# Patient Record
Sex: FEMALE | Race: WHITE | NOT HISPANIC OR LATINO | Employment: FULL TIME | URBAN - METROPOLITAN AREA
[De-identification: names, ages, dates, MRNs, and addresses within clinical notes are randomized per-mention and may not be internally consistent; named-entity substitution may affect disease eponyms.]

---

## 2017-06-29 ENCOUNTER — TELEPHONE (OUTPATIENT)
Dept: OBSTETRICS AND GYNECOLOGY | Facility: CLINIC | Age: 47
End: 2017-06-29

## 2017-07-02 RX ORDER — DESOGESTREL AND ETHINYL ESTRADIOL 0.15-0.03
1 KIT ORAL DAILY
Qty: 84 TABLET | Refills: 0 | Status: SHIPPED | OUTPATIENT
Start: 2017-07-02 | End: 2018-01-19 | Stop reason: SDUPTHER

## 2017-10-31 RX ORDER — DESOGESTREL AND ETHINYL ESTRADIOL 0.15-0.03
KIT ORAL
Qty: 84 TABLET | Refills: 0 | OUTPATIENT
Start: 2017-10-31

## 2017-11-08 ENCOUNTER — OFFICE VISIT (OUTPATIENT)
Dept: RETAIL CLINIC | Facility: CLINIC | Age: 47
End: 2017-11-08

## 2017-11-08 VITALS
RESPIRATION RATE: 18 BRPM | OXYGEN SATURATION: 98 % | HEART RATE: 109 BPM | SYSTOLIC BLOOD PRESSURE: 118 MMHG | DIASTOLIC BLOOD PRESSURE: 80 MMHG | TEMPERATURE: 99.3 F

## 2017-11-08 DIAGNOSIS — H66.001 ACUTE SUPPURATIVE OTITIS MEDIA OF RIGHT EAR WITHOUT SPONTANEOUS RUPTURE OF TYMPANIC MEMBRANE, RECURRENCE NOT SPECIFIED: Primary | ICD-10-CM

## 2017-11-08 PROBLEM — H92.02 LEFT EAR PAIN: Status: RESOLVED | Noted: 2017-11-08 | Resolved: 2017-11-08

## 2017-11-08 PROBLEM — H92.01 RIGHT EAR PAIN: Status: ACTIVE | Noted: 2017-11-08

## 2017-11-08 PROBLEM — H92.02 LEFT EAR PAIN: Status: ACTIVE | Noted: 2017-11-08

## 2017-11-08 PROCEDURE — 99213 OFFICE O/P EST LOW 20 MIN: CPT | Performed by: NURSE PRACTITIONER

## 2017-11-08 RX ORDER — AMOXICILLIN 875 MG/1
875 TABLET, COATED ORAL 2 TIMES DAILY
Qty: 20 TABLET | Refills: 0 | Status: SHIPPED | OUTPATIENT
Start: 2017-11-08 | End: 2018-01-25

## 2017-11-08 NOTE — PROGRESS NOTES
Subjective   Cori Griffith is a 47 y.o. female.     Earache    There is pain in the right ear. This is a new problem. The current episode started yesterday. There has been no fever. The pain is at a severity of 5/10. The pain is moderate. Pertinent negatives include no headaches or rhinorrhea. She has tried nothing for the symptoms. The treatment provided mild relief. There is no history of a chronic ear infection, hearing loss or a tympanostomy tube.        The following portions of the patient's history were reviewed and updated as appropriate: allergies, current medications, past family history, past medical history, past social history, past surgical history and problem list.    Review of Systems   Constitutional: Negative for chills, fatigue and fever.   HENT: Positive for ear pain. Negative for rhinorrhea.         Right ear pain  right ear cerumen wash was given in the urgent care on last week   Eyes: Negative.    Respiratory: Negative.    Cardiovascular: Negative.    Gastrointestinal: Negative.    Musculoskeletal: Negative.         Some right rib tenderness due to previously  treated injury   Neurological: Negative for headaches.       Objective   Physical Exam   Constitutional: She appears well-developed.   HENT:   Right Ear: Tympanic membrane is injected and erythematous. Tympanic membrane mobility is abnormal.   Left Ear: External ear normal. Tympanic membrane is not injected and not erythematous. Tympanic membrane mobility is normal.   Eyes: Pupils are equal, round, and reactive to light.   Neck: Normal range of motion.   Cardiovascular: Normal rate, regular rhythm and normal heart sounds.    Pulmonary/Chest: Effort normal and breath sounds normal. No respiratory distress. She has no decreased breath sounds. She has no wheezes. She has no rhonchi. She has no rales. She exhibits no tenderness.   Abdominal: Soft.   Musculoskeletal:   Right rib tenderness due to previous injury and treated recently in the  ER as per patient    Nursing note and vitals reviewed.      Assessment/Plan   Cori was seen today for earache.    Diagnoses and all orders for this visit:    Acute suppurative otitis media of right ear without spontaneous rupture of tympanic membrane, recurrence not specified  -     amoxicillin (AMOXIL) 875 MG tablet; Take 1 tablet by mouth 2 (Two) Times a Day.      Talked to the patient about the diagnosis and educate the patient and advise to visit to PCP if the symptoms worsens  Talked the patient to go to her primary doctor to treat the rib pain( may need xray or something)

## 2017-11-17 ENCOUNTER — TRANSCRIBE ORDERS (OUTPATIENT)
Dept: OBSTETRICS AND GYNECOLOGY | Facility: CLINIC | Age: 47
End: 2017-11-17

## 2017-11-17 DIAGNOSIS — Z12.39 SCREENING BREAST EXAMINATION: Primary | ICD-10-CM

## 2017-12-08 ENCOUNTER — HOSPITAL ENCOUNTER (OUTPATIENT)
Dept: MAMMOGRAPHY | Facility: HOSPITAL | Age: 47
Discharge: HOME | End: 2017-12-08
Attending: OBSTETRICS & GYNECOLOGY | Admitting: OBSTETRICS & GYNECOLOGY

## 2017-12-08 DIAGNOSIS — Z12.39 SCREENING BREAST EXAMINATION: ICD-10-CM

## 2017-12-08 PROCEDURE — 77063 BREAST TOMOSYNTHESIS BI: CPT

## 2017-12-08 PROCEDURE — G0202 SCR MAMMO BI INCL CAD: HCPCS

## 2018-01-20 RX ORDER — DESOGESTREL AND ETHINYL ESTRADIOL 0.15-0.03
KIT ORAL
Qty: 84 TABLET | Refills: 0 | Status: SHIPPED | OUTPATIENT
Start: 2018-01-20 | End: 2018-01-25 | Stop reason: SDUPTHER

## 2018-01-25 ENCOUNTER — OFFICE VISIT (OUTPATIENT)
Dept: OBSTETRICS AND GYNECOLOGY | Facility: CLINIC | Age: 48
End: 2018-01-25

## 2018-01-25 VITALS
SYSTOLIC BLOOD PRESSURE: 122 MMHG | HEIGHT: 64 IN | BODY MASS INDEX: 37.56 KG/M2 | WEIGHT: 220 LBS | DIASTOLIC BLOOD PRESSURE: 82 MMHG

## 2018-01-25 DIAGNOSIS — Z00.00 ANNUAL PHYSICAL EXAM: Primary | ICD-10-CM

## 2018-01-25 DIAGNOSIS — Z01.419 PAP SMEAR, LOW-RISK: ICD-10-CM

## 2018-01-25 DIAGNOSIS — Z11.51 SPECIAL SCREENING EXAMINATION FOR HUMAN PAPILLOMAVIRUS (HPV): ICD-10-CM

## 2018-01-25 PROBLEM — Z80.3 FAMILY HISTORY OF BREAST CANCER: Status: ACTIVE | Noted: 2018-01-25

## 2018-01-25 LAB
B-HCG UR QL: NEGATIVE
BILIRUB BLD-MCNC: NEGATIVE MG/DL
CLARITY, POC: CLEAR
COLOR UR: YELLOW
GLUCOSE UR STRIP-MCNC: NEGATIVE MG/DL
INTERNAL NEGATIVE CONTROL: NEGATIVE
INTERNAL POSITIVE CONTROL: POSITIVE
KETONES UR QL: NEGATIVE
LEUKOCYTE EST, POC: NEGATIVE
Lab: NORMAL
NITRITE UR-MCNC: NEGATIVE MG/ML
PH UR: 5 [PH] (ref 5–8)
PROT UR STRIP-MCNC: NEGATIVE MG/DL
RBC # UR STRIP: NEGATIVE /UL
SP GR UR: 1.02 (ref 1–1.03)
UROBILINOGEN UR QL: NORMAL

## 2018-01-25 PROCEDURE — 99396 PREV VISIT EST AGE 40-64: CPT | Performed by: OBSTETRICS & GYNECOLOGY

## 2018-01-25 PROCEDURE — 81025 URINE PREGNANCY TEST: CPT | Performed by: OBSTETRICS & GYNECOLOGY

## 2018-01-25 PROCEDURE — 81002 URINALYSIS NONAUTO W/O SCOPE: CPT | Performed by: OBSTETRICS & GYNECOLOGY

## 2018-01-25 RX ORDER — DESOGESTREL AND ETHINYL ESTRADIOL 0.15-0.03
1 KIT ORAL DAILY
Qty: 84 TABLET | Refills: 3 | Status: SHIPPED | OUTPATIENT
Start: 2018-01-25 | End: 2019-02-21 | Stop reason: DRUGHIGH

## 2018-01-25 NOTE — PROGRESS NOTES
"GYN Annual Exam     CC- Here for annual exam.     Cori Griffith is a 47 y.o. female who presents for annual well woman exam. Periods are regular every 28-30 days, lasting 7 days. Dysmenorrhea:none. Cyclic symptoms include none. No intermenstrual bleeding, spotting, or discharge.  Patient is sexually active  yes -  . Patient is satisfied with her contraception yes - OCPs. Stopped her OCPs bc she ran out. Pt has not had heavy cycles since she stopped OCPs in 10/2017    OB History      Para Term  AB Living      0       SAB TAB Ectopic Multiple Live Births                  Current contraception: OCP (estrogen/progesterone)  History of abnormal Pap smear: no  History of abnormal mammogram: yes - abnormal MMG but repeat imaging normal.  Family history of uterine, colon or ovarian cancer: no  Family history of breast cancer: yes - sister diagnosed age 36    Health Maintenance   Topic Date Due   • TDAP/TD VACCINES (1 - Tdap) 1988   • PAP SMEAR  2017   • INFLUENZA VACCINE  2017       No past medical history on file.    No past surgical history on file.      Current Outpatient Prescriptions:   •  desogestrel-ethinyl estradiol (ENSKYCE) 0.15-30 MG-MCG per tablet, Take 1 tablet by mouth Daily., Disp: 84 tablet, Rfl: 3    No Known Allergies    Social History   Substance Use Topics   • Smoking status: Never Smoker   • Smokeless tobacco: Never Used   • Alcohol use None       Family History   Problem Relation Age of Onset   • Breast cancer Sister    • Cancer Sister        Review of Systems   Constitutional: Negative for activity change, appetite change and unexpected weight change.   Gastrointestinal: Negative for constipation and diarrhea.   Genitourinary: Negative for dyspareunia, menstrual problem, pelvic pain, vaginal bleeding and vaginal discharge.       /82  Ht 162.6 cm (64\")  Wt 99.8 kg (220 lb)  LMP 2018  Breastfeeding? No  BMI 37.76 kg/m2    Physical Exam "   Constitutional: She is oriented to person, place, and time. She appears well-developed and well-nourished.   HENT:   Mouth/Throat: Normal dentition. No dental caries.   Cardiovascular: Normal rate and regular rhythm.    Pulmonary/Chest: Effort normal and breath sounds normal. Right breast exhibits no inverted nipple, no mass, no nipple discharge, no skin change and no tenderness. Left breast exhibits no inverted nipple, no mass, no nipple discharge, no skin change and no tenderness.   Abdominal: Soft. She exhibits no distension and no mass. There is no tenderness.   Genitourinary: There is no rash, tenderness or lesion on the right labia. There is no rash, tenderness or lesion on the left labia. Uterus is not deviated, not enlarged, not fixed and not tender. Cervix exhibits no motion tenderness, no discharge and no friability. Right adnexum displays no mass, no tenderness and no fullness. Left adnexum displays no mass, no tenderness and no fullness. No tenderness or bleeding in the vagina. No vaginal discharge found.   Genitourinary Comments: Exam limited by body habitus   Neurological: She is alert and oriented to person, place, and time.   Psychiatric: She has a normal mood and affect. Her behavior is normal. Judgment and thought content normal.   Vitals reviewed.         Assessment/Plan    1) GYN HM: Check pap smear. Last MMG 12/2017. SBE demonstrated and encouraged.  2) yeast under breasts: Rx Lotrisone cream bid x 7 days  3) Contraception: OCPs. Desires to restart. Rx OCPs  4) Family Planning: G0.  5) Diet and Exercise discussed. Has started doing a green juice cleanse.  6) Smoking Status: nonsmoker  7) Social: works from home  8) Family Hx of breast cancer: Sister dx age 36- had BRCA done and negative  9) Follow up prn and 1 year       Diagnoses and all orders for this visit:    Annual physical exam  -     POC Urinalysis Dipstick  -     POC Pregnancy, Urine    Pap smear, low-risk  -     Pap IG, HPV-hr -  ThinPrep Vial, Cervix    Special screening examination for human papillomavirus (HPV)  -     Pap IG, HPV-hr - ThinPrep Vial, Cervix    Other orders  -     desogestrel-ethinyl estradiol (ENSKYCE) 0.15-30 MG-MCG per tablet; Take 1 tablet by mouth Daily.          Fanny Santiago,   1/25/2018  8:27 PM

## 2018-01-30 LAB
CYTOLOGIST CVX/VAG CYTO: NORMAL
CYTOLOGY CVX/VAG DOC THIN PREP: NORMAL
DX ICD CODE: NORMAL
HIV 1 & 2 AB SER-IMP: NORMAL
HPV I/H RISK 1 DNA CVX QL PROBE+SIG AMP: NEGATIVE
OTHER STN SPEC: NORMAL
PATH REPORT.FINAL DX SPEC: NORMAL
STAT OF ADQ CVX/VAG CYTO-IMP: NORMAL

## 2019-01-28 ENCOUNTER — TELEPHONE (OUTPATIENT)
Dept: OBSTETRICS AND GYNECOLOGY | Facility: CLINIC | Age: 49
End: 2019-01-28

## 2019-01-28 RX ORDER — DESOGESTREL AND ETHINYL ESTRADIOL 0.15-0.03
1 KIT ORAL DAILY
Qty: 28 TABLET | Refills: 1 | Status: SHIPPED | OUTPATIENT
Start: 2019-01-28 | End: 2019-02-21 | Stop reason: DRUGHIGH

## 2019-02-13 ENCOUNTER — TRANSCRIBE ORDERS (OUTPATIENT)
Dept: ADMINISTRATIVE | Facility: HOSPITAL | Age: 49
End: 2019-02-13

## 2019-02-13 ENCOUNTER — TRANSCRIBE ORDERS (OUTPATIENT)
Dept: OBSTETRICS AND GYNECOLOGY | Facility: CLINIC | Age: 49
End: 2019-02-13

## 2019-02-13 DIAGNOSIS — R60.0 EDEMA OF LEFT LOWER EXTREMITY: Primary | ICD-10-CM

## 2019-02-13 DIAGNOSIS — Z12.39 SCREENING BREAST EXAMINATION: Primary | ICD-10-CM

## 2019-02-15 ENCOUNTER — HOSPITAL ENCOUNTER (OUTPATIENT)
Dept: ULTRASOUND IMAGING | Facility: HOSPITAL | Age: 49
Discharge: HOME | End: 2019-02-15
Admitting: PHYSICIAN ASSISTANT

## 2019-02-15 DIAGNOSIS — R60.0 EDEMA OF LEFT LOWER EXTREMITY: ICD-10-CM

## 2019-02-15 PROCEDURE — 93971 EXTREMITY STUDY: CPT

## 2019-02-20 ENCOUNTER — HOSPITAL ENCOUNTER (OUTPATIENT)
Dept: MAMMOGRAPHY | Facility: HOSPITAL | Age: 49
Discharge: HOME | End: 2019-02-20
Admitting: OBSTETRICS & GYNECOLOGY

## 2019-02-20 DIAGNOSIS — Z12.39 SCREENING BREAST EXAMINATION: ICD-10-CM

## 2019-02-20 PROCEDURE — 77067 SCR MAMMO BI INCL CAD: CPT

## 2019-02-20 PROCEDURE — 77063 BREAST TOMOSYNTHESIS BI: CPT

## 2019-02-21 ENCOUNTER — OFFICE VISIT (OUTPATIENT)
Dept: OBSTETRICS AND GYNECOLOGY | Facility: CLINIC | Age: 49
End: 2019-02-21

## 2019-02-21 VITALS
BODY MASS INDEX: 37.56 KG/M2 | HEIGHT: 64 IN | SYSTOLIC BLOOD PRESSURE: 124 MMHG | WEIGHT: 220 LBS | DIASTOLIC BLOOD PRESSURE: 78 MMHG

## 2019-02-21 DIAGNOSIS — M25.511 ACUTE PAIN OF RIGHT SHOULDER: ICD-10-CM

## 2019-02-21 DIAGNOSIS — Z01.419 WELL FEMALE EXAM WITH ROUTINE GYNECOLOGICAL EXAM: Primary | ICD-10-CM

## 2019-02-21 LAB
B-HCG UR QL: NEGATIVE
BILIRUB BLD-MCNC: NEGATIVE MG/DL
CLARITY, POC: CLEAR
COLOR UR: YELLOW
GLUCOSE UR STRIP-MCNC: NEGATIVE MG/DL
INTERNAL NEGATIVE CONTROL: NEGATIVE
INTERNAL POSITIVE CONTROL: POSITIVE
KETONES UR QL: NEGATIVE
LEUKOCYTE EST, POC: NEGATIVE
Lab: NORMAL
NITRITE UR-MCNC: NEGATIVE MG/ML
PH UR: 6 [PH] (ref 5–8)
PROT UR STRIP-MCNC: NEGATIVE MG/DL
RBC # UR STRIP: NEGATIVE /UL
SP GR UR: 1.02 (ref 1–1.03)
UROBILINOGEN UR QL: NORMAL

## 2019-02-21 PROCEDURE — 81025 URINE PREGNANCY TEST: CPT | Performed by: OBSTETRICS & GYNECOLOGY

## 2019-02-21 PROCEDURE — 99396 PREV VISIT EST AGE 40-64: CPT | Performed by: OBSTETRICS & GYNECOLOGY

## 2019-02-21 PROCEDURE — 81002 URINALYSIS NONAUTO W/O SCOPE: CPT | Performed by: OBSTETRICS & GYNECOLOGY

## 2019-02-21 RX ORDER — CLOTRIMAZOLE AND BETAMETHASONE DIPROPIONATE 10; .64 MG/G; MG/G
CREAM TOPICAL 2 TIMES DAILY
Qty: 45 G | Refills: 0 | Status: SHIPPED | OUTPATIENT
Start: 2019-02-21 | End: 2019-02-28

## 2019-02-21 RX ORDER — CEPHALEXIN 500 MG/1
500 CAPSULE ORAL 2 TIMES DAILY
COMMUNITY
End: 2020-04-02

## 2019-02-21 RX ORDER — DESOGESTREL AND ETHINYL ESTRADIOL 21-5 (28)
1 KIT ORAL DAILY
Qty: 84 TABLET | Refills: 3 | Status: SHIPPED | OUTPATIENT
Start: 2019-02-21 | End: 2020-02-21

## 2019-02-21 NOTE — PROGRESS NOTES
GYN Annual Exam     CC- Here for annual exam.     Cori Griffith is a 49 y.o. female who presents for annual well woman exam. Periods are regular every 28-30 days, lasting 7 days. Dysmenorrhea:none. Cyclic symptoms include none. No intermenstrual bleeding, spotting, or discharge.  Patient is sexually active  yes -  . Patient is satisfied with her contraception yes - OCPs. Pt has been off her OCPs for last 7-8 months. She desires to restart. Pt reports she has right shoulder pain since she had a massage in 2018. Pt has pain lifting her right arm. Was seen by Urgent care and given muscle relaxers.        OB History      Para Term  AB Living        0          SAB TAB Ectopic Molar Multiple Live Births                         Current contraception: OCP (estrogen/progesterone)  History of abnormal Pap smear: no  History of abnormal mammogram: yes - abnormal MMG but repeat imaging normal.  Family history of uterine, colon or ovarian cancer: no  Family history of breast cancer: yes - sister diagnosed age 36        Health Maintenance   Topic Date Due   • TDAP/TD VACCINES (1 - Tdap) 1988   • COLONOSCOPY  2017   • INFLUENZA VACCINE  2018   • ANNUAL PHYSICAL  2019   • ZOSTER VACCINE (1 of 2) 2019   • PAP SMEAR  2021       History reviewed. No pertinent past medical history.    History reviewed. No pertinent surgical history.      Current Outpatient Medications:   •  cephalexin (KEFLEX) 500 MG capsule, Take 500 mg by mouth 2 (Two) Times a Day., Disp: , Rfl:   •  mupirocin (BACTROBAN) 2 % ointment, Apply  topically to the appropriate area as directed 3 (Three) Times a Day., Disp: , Rfl:   •  clotrimazole-betamethasone (LOTRISONE) 1-0.05 % cream, Apply  topically to the appropriate area as directed 2 (Two) Times a Day for 7 days., Disp: 45 g, Rfl: 0  •  desogestrel-ethinyl estradiol (KARIVA) 0.15-0.02/0.01 MG (/) per tablet, Take 1 tablet by mouth Daily., Disp: 84  "tablet, Rfl: 3    No Known Allergies    Social History     Tobacco Use   • Smoking status: Never Smoker   • Smokeless tobacco: Never Used   Substance Use Topics   • Alcohol use: Not on file   • Drug use: Not on file       Family History   Problem Relation Age of Onset   • Breast cancer Sister    • Cancer Sister        Review of Systems   Gastrointestinal: Negative for abdominal distention and abdominal pain.   Genitourinary: Negative for dyspareunia, menstrual problem, pelvic pain, vaginal bleeding, vaginal discharge and vaginal pain.       /78   Ht 162.6 cm (64.02\")   Wt 99.8 kg (220 lb)   LMP 02/21/2019   BMI 37.74 kg/m²     Physical Exam   Constitutional: She is oriented to person, place, and time. She appears well-developed and well-nourished.   HENT:   Mouth/Throat: Normal dentition. No dental caries.   Cardiovascular: Normal rate and regular rhythm.   Pulmonary/Chest: Effort normal and breath sounds normal. Right breast exhibits no inverted nipple, no mass, no nipple discharge, no skin change and no tenderness. Left breast exhibits no inverted nipple, no mass, no nipple discharge, no skin change and no tenderness.   Abdominal: Soft. She exhibits no distension and no mass. There is no tenderness.   Genitourinary: There is no rash, tenderness or lesion on the right labia. There is no rash, tenderness or lesion on the left labia. Uterus is not deviated, not enlarged, not fixed and not tender. Cervix exhibits no motion tenderness, no discharge and no friability. Right adnexum displays no mass, no tenderness and no fullness. Left adnexum displays no mass, no tenderness and no fullness. No tenderness or bleeding in the vagina. No vaginal discharge found.   Neurological: She is alert and oriented to person, place, and time.   Psychiatric: She has a normal mood and affect. Her behavior is normal. Judgment and thought content normal.   Vitals reviewed.         Assessment/Plan    1) GYN HM: LPS 1/2018. Last " MMG 2/2019. SBE demonstrated and encouraged. Discussed colonoscopy. Pt declines referral at this time.  2) yeast at perineum: Rx Lotrisone  3) Contraception: OCPs. Desires to restart. Will restart OCPs but at a lower dose.  4) Family Planning: G0.  5) Diet and Exercise discussed.   6) Smoking Status: nonsmoker  7) Social: works from home. Has a possum as a pet  8) Family Hx of breast cancer: Sister dx age 36- had BRCA done and negative  9) Right shoulder pain: Referral to PT.  10) Follow up prn and 1 year           Diagnoses and all orders for this visit:    Well female exam with routine gynecological exam  -     POC Urinalysis Dipstick  -     POC Pregnancy, Urine    Acute pain of right shoulder  -     Ambulatory Referral to Physical Therapy    Other orders  -     cephalexin (KEFLEX) 500 MG capsule; Take 500 mg by mouth 2 (Two) Times a Day.  -     mupirocin (BACTROBAN) 2 % ointment; Apply  topically to the appropriate area as directed 3 (Three) Times a Day.  -     clotrimazole-betamethasone (LOTRISONE) 1-0.05 % cream; Apply  topically to the appropriate area as directed 2 (Two) Times a Day for 7 days.  -     desogestrel-ethinyl estradiol (KARIVA) 0.15-0.02/0.01 MG (21/5) per tablet; Take 1 tablet by mouth Daily.          Fanny Santiago,   2/21/2019  1:11 PM

## 2019-02-27 DIAGNOSIS — N63.10 BREAST MASS, RIGHT: Primary | ICD-10-CM

## 2019-03-01 ENCOUNTER — HOSPITAL ENCOUNTER (OUTPATIENT)
Dept: ULTRASOUND IMAGING | Facility: HOSPITAL | Age: 49
Discharge: HOME | End: 2019-03-01
Admitting: OBSTETRICS & GYNECOLOGY

## 2019-03-01 ENCOUNTER — HOSPITAL ENCOUNTER (OUTPATIENT)
Dept: PHYSICAL THERAPY | Facility: HOSPITAL | Age: 49
Setting detail: THERAPIES SERIES
Discharge: HOME | End: 2019-03-01

## 2019-03-01 DIAGNOSIS — N63.10 BREAST MASS, RIGHT: ICD-10-CM

## 2019-03-01 DIAGNOSIS — M25.511 ACUTE PAIN OF RIGHT SHOULDER: Primary | ICD-10-CM

## 2019-03-01 PROCEDURE — 97161 PT EVAL LOW COMPLEX 20 MIN: CPT

## 2019-03-01 PROCEDURE — G0283 ELEC STIM OTHER THAN WOUND: HCPCS

## 2019-03-01 PROCEDURE — 97110 THERAPEUTIC EXERCISES: CPT

## 2019-03-01 PROCEDURE — 76642 ULTRASOUND BREAST LIMITED: CPT
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
## 2019-03-04 ENCOUNTER — HOSPITAL ENCOUNTER (OUTPATIENT)
Dept: PHYSICAL THERAPY | Facility: HOSPITAL | Age: 49
Setting detail: THERAPIES SERIES
Discharge: HOME | End: 2019-03-04

## 2019-03-04 DIAGNOSIS — M25.511 ACUTE PAIN OF RIGHT SHOULDER: Primary | ICD-10-CM

## 2019-03-04 PROCEDURE — G0283 ELEC STIM OTHER THAN WOUND: HCPCS | Performed by: PHYSICAL THERAPIST

## 2019-03-04 PROCEDURE — 97110 THERAPEUTIC EXERCISES: CPT | Performed by: PHYSICAL THERAPIST

## 2019-03-07 ENCOUNTER — HOSPITAL ENCOUNTER (OUTPATIENT)
Dept: PHYSICAL THERAPY | Facility: HOSPITAL | Age: 49
Setting detail: THERAPIES SERIES
Discharge: HOME | End: 2019-03-07

## 2019-03-07 DIAGNOSIS — M25.511 ACUTE PAIN OF RIGHT SHOULDER: Primary | ICD-10-CM

## 2019-03-07 PROCEDURE — G0283 ELEC STIM OTHER THAN WOUND: HCPCS | Performed by: PHYSICAL THERAPIST

## 2019-03-07 PROCEDURE — 97110 THERAPEUTIC EXERCISES: CPT | Performed by: PHYSICAL THERAPIST

## 2019-03-07 NOTE — THERAPY TREATMENT NOTE
Outpatient Physical Therapy Ortho Treatment Note   Jackie Blair     Patient Name: Cori Griffith  : 1970  MRN: [id number]  Today's Date: 3/7/2019      Visit Date: 2019    Visit Dx:    ICD-10-CM ICD-9-CM   1. Acute pain of right shoulder M25.511 719.41       Patient Active Problem List   Diagnosis   • Right ear pain   • Family history of breast cancer        Past Medical History:   Diagnosis Date   • Lymphedema of both lower extremities         No past surgical history on file.                    PT Assessment/Plan     Row Name 19          PT Assessment    Assessment Comments  Pt is doing well with decreased c/o pain and improving function.  -GC        PT Plan    PT Plan Comments  Pt is to continue her HEP daily. Will continue therapy 2x weekly.  -GC       User Key  (r) = Recorded By, (t) = Taken By, (c) = Cosigned By    Initials Name Provider Type    GC Coleman Sandoval, PT Physical Therapist          Modalities     Row Name 19             Moist Heat    MH Applied  Yes  -GC      Location  Right shoulder  with IFC with patient seated in chair.  -GC      Rx Minutes  15 mins  -GC       S/P Rx  Yes  -GC         ELECTRICAL STIMULATION    Attended/Unattended  Unattended  -GC      Stimulation Type  IFC  -GC      Location/Electrode Placement/Other  Right shoulder area/UT with MH.  -GC       PT E-Stim Unattended (Manual) Minutes  15  -GC        User Key  (r) = Recorded By, (t) = Taken By, (c) = Cosigned By    Initials Name Provider Type    GC Coleman Sandoval, PT Physical Therapist          Exercises     Row Name 19             Precautions    Existing Precautions/Restrictions  no known precautions/restrictions  -GC         Exercise 1    Exercise Name 1  right shoulder grade II GH joint mobilizations inferior and posterior  -GC      Cueing 1  Verbal  -GC      Time 1  5 min  -GC         Exercise 2    Exercise Name 2  Passive right shoulder IR and ER stretches  -GC       Cueing 2  Verbal  -GC      Time 2  5 min  -GC         Exercise 3    Exercise Name 3  Cane stretch in supine for FLEX  -GC      Cueing 3  Verbal;Tactile  -GC      Reps 3  10  -GC      Time 3  5 secs  -GC         Exercise 4    Exercise Name 4  Cane stretch in supine for ER  -GC      Cueing 4  Verbal;Tactile  -GC      Reps 4  10  -GC      Time 4  5 secs  -GC         Exercise 5    Exercise Name 5  ER vs threaband  -GC      Cueing 5  Verbal;Demo  -GC      Reps 5  25  -GC      Time 5  latex free red  -GC         Exercise 6    Exercise Name 6  IR vs theraband  -GC      Cueing 6  Verbal;Demo  -GC      Reps 6  25  -GC      Time 6  latex free green  -GC         Exercise 7    Exercise Name 7  Rowing vs theraband  -GC      Cueing 7  Verbal;Demo  -GC      Reps 7  25  -GC      Time 7  latex free green  -GC         Exercise 8    Exercise Name 8  Scaption Down  -GC      Cueing 8  Verbal;Demo;Tactile  -GC      Reps 8  25  -GC      Time 8  1#  -GC         Exercise 9    Exercise Name 9  SL Sleeper Strech  -GC      Cueing 9  Verbal;Tactile  -GC      Reps 9  10  -GC      Time 9  5 secs  -GC         Exercise 10    Exercise Name 10  Scaption Up  -GC      Cueing 10  Verbal;Demo  -GC      Reps 10  25  -GC      Time 10  1#  -GC         Exercise 11    Exercise Name 11  Shoulder EXT-bilateral  -GC      Cueing 11  Verbal;Demo  -GC      Reps 11  25  -GC      Time 11  latex free green  -GC        User Key  (r) = Recorded By, (t) = Taken By, (c) = Cosigned By    Initials Name Provider Type    GC Coleman Sandoval, PT Physical Therapist                                            Time Calculation:   Start Time: 0715  Stop Time: 0834  Time Calculation (min): 79 min    Therapy Charges for Today     Code Description Service Date Service Provider Modifiers Qty    19649831976 HC PT ELECTRICAL STIM UNATTENDED 3/7/2019 Coleman Sandoval, PT  1    09865959909 HC PT THER PROC EA 15 MIN 3/7/2019 Coleman Sandoval, PT GP 2                    Coleman Sandoval,  PT  3/7/2019

## 2019-03-12 ENCOUNTER — APPOINTMENT (OUTPATIENT)
Dept: PHYSICAL THERAPY | Facility: HOSPITAL | Age: 49
End: 2019-03-12

## 2019-03-14 ENCOUNTER — HOSPITAL ENCOUNTER (OUTPATIENT)
Dept: PHYSICAL THERAPY | Facility: HOSPITAL | Age: 49
Setting detail: THERAPIES SERIES
Discharge: HOME | End: 2019-03-14

## 2019-03-14 DIAGNOSIS — M25.511 ACUTE PAIN OF RIGHT SHOULDER: Primary | ICD-10-CM

## 2019-03-14 PROCEDURE — G0283 ELEC STIM OTHER THAN WOUND: HCPCS | Performed by: PHYSICAL THERAPIST

## 2019-03-14 PROCEDURE — 97110 THERAPEUTIC EXERCISES: CPT | Performed by: PHYSICAL THERAPIST

## 2019-03-14 PROCEDURE — 97140 MANUAL THERAPY 1/> REGIONS: CPT | Performed by: PHYSICAL THERAPIST

## 2019-03-14 NOTE — THERAPY TREATMENT NOTE
Outpatient Physical Therapy Ortho Treatment Note   Jackie [facility]     Patient Name: Cori Griffith  : 1970  MRN: [id number]  Today's Date: 3/14/2019      Visit Date: 2019    Visit Dx:    ICD-10-CM ICD-9-CM   1. Acute pain of right shoulder M25.511 719.41       Patient Active Problem List   Diagnosis   • Right ear pain   • Family history of breast cancer        Past Medical History:   Diagnosis Date   • Lymphedema of both lower extremities         No past surgical history on file.                    PT Assessment/Plan     Row Name 19 0745          PT Assessment    Assessment Comments  Pt is doing well with increasing shoulder ROM and improving function.  -GC        PT Plan    PT Plan Comments  Pt is to continue her HEP 1-2x daily.  -GC       User Key  (r) = Recorded By, (t) = Taken By, (c) = Cosigned By    Initials Name Provider Type     Coleman Sandoval, PT Physical Therapist          Modalities     Row Name 19 0745             Moist Heat    MH Applied  Yes  -GC      Location  Right shoulder  with IFC with patient seated in chair.  -GC      Rx Minutes  15 mins  -      MH S/P Rx  Yes  -GC         ELECTRICAL STIMULATION    Attended/Unattended  Unattended  -      Stimulation Type  IFC  -GC      Location/Electrode Placement/Other  Right shoulder area/UT with MH.  -       PT E-Stim Unattended (Manual) Minutes  15  -GC        User Key  (r) = Recorded By, (t) = Taken By, (c) = Cosigned By    Initials Name Provider Type     Coleman Sandoval, PT Physical Therapist          Exercises     Row Name 19 0745             Precautions    Existing Precautions/Restrictions  no known precautions/restrictions  -         Subjective Comments    Subjective Comments  Pt states her shoulder is feeling better and is easier to move.  -GC         Exercise 1    Exercise Name 1  --  -GC      Cueing 1  --  -GC      Time 1  --  -GC         Exercise 2    Exercise Name 2  Cane stretch in standing for ABD   -GC      Cueing 2  Verbal;Demo  -GC      Reps 2  15  -GC      Time 2  5 secs  -GC         Exercise 3    Exercise Name 3  Cane stretch in supine for FLEX  -GC      Cueing 3  Verbal;Tactile  -GC      Reps 3  15  -GC      Time 3  5 secs  -GC         Exercise 4    Exercise Name 4  Cane stretch in supine for ER  -GC      Cueing 4  Verbal;Tactile  -GC      Reps 4  15  -GC      Time 4  5 secs  -GC         Exercise 5    Exercise Name 5  ER vs threaband  -GC      Cueing 5  Verbal;Demo  -GC      Reps 5  25  -GC      Time 5  latex free green  -GC         Exercise 6    Exercise Name 6  IR vs theraband  -GC      Cueing 6  Verbal;Demo  -GC      Reps 6  25  -GC      Time 6  latex free blue  -GC         Exercise 7    Exercise Name 7  Rowing vs theraband  -GC      Cueing 7  Verbal;Demo  -GC      Reps 7  25  -GC      Time 7  latex free blue  -GC         Exercise 8    Exercise Name 8  Scaption Down  -GC      Cueing 8  Verbal;Demo;Tactile  -GC      Reps 8  25  -GC      Time 8  1#  -GC         Exercise 9    Exercise Name 9  SL Sleeper Strech  -GC      Cueing 9  Verbal;Tactile  -GC      Reps 9  10  -GC      Time 9  5 secs  -GC         Exercise 10    Exercise Name 10  Scaption Up  -GC      Cueing 10  Verbal;Demo  -GC      Reps 10  25  -GC      Time 10  1#  -GC         Exercise 11    Exercise Name 11  Shoulder EXT-bilateral  -GC      Cueing 11  Verbal;Demo  -GC      Reps 11  25  -GC      Time 11  latex free blue  -GC        User Key  (r) = Recorded By, (t) = Taken By, (c) = Cosigned By    Initials Name Provider Type    GC Coleman Sandoval PT Physical Therapist                        Manual Rx (last 36 hours)      Manual Treatments     Row Name 03/14/19 0745             Manual Rx 1    Manual Rx 1 Location  Right shoulder  -GC      Manual Rx 1 Type  AA/PROM  -GC      Manual Rx 1 Duration  10 reps each plane with patient supine in hooklying.  -GC         Manual Rx 2    Manual Rx 2 Location  right shoulder  -GC      Manual Rx 2 Type  GH joint  mobilizations  -      Manual Rx 2 Grade  grade II  -GC      Manual Rx 2 Duration  5 min  -        User Key  (r) = Recorded By, (t) = Taken By, (c) = Cosigned By    Initials Name Provider Type     Coleman Sandoval, PT Physical Therapist                             Time Calculation:   Start Time: 0745  Stop Time: 0852  Time Calculation (min): 67 min    Therapy Charges for Today     Code Description Service Date Service Provider Modifiers Qty    11499102924  PT ELECTRICAL STIM UNATTENDED 3/14/2019 Coleman Sandoval, PT  1    [id number]  PT MANUAL THERAPY EA 15 MIN 3/14/2019 Coleman Sandoval, PT GP 1    [id number]  PT THER PROC EA 15 MIN 3/14/2019 Coleman Sandoval, PT GP 1                    Coleman Sandoval, PT  3/14/2019

## 2019-03-19 ENCOUNTER — HOSPITAL ENCOUNTER (OUTPATIENT)
Dept: PHYSICAL THERAPY | Facility: HOSPITAL | Age: 49
Setting detail: THERAPIES SERIES
Discharge: HOME | End: 2019-03-19

## 2019-03-19 DIAGNOSIS — M25.511 ACUTE PAIN OF RIGHT SHOULDER: Primary | ICD-10-CM

## 2019-03-19 PROCEDURE — 97140 MANUAL THERAPY 1/> REGIONS: CPT | Performed by: PHYSICAL THERAPIST

## 2019-03-19 PROCEDURE — 97110 THERAPEUTIC EXERCISES: CPT | Performed by: PHYSICAL THERAPIST

## 2019-03-19 NOTE — THERAPY TREATMENT NOTE
Outpatient Physical Therapy Hand Treatment Note    Jackie Blair     Patient Name: Cori Griffith  : 1970  MRN: [id number]  Today's Date: 3/19/2019         Visit Date: 2019    Visit Dx:    ICD-10-CM ICD-9-CM   1. Acute pain of right shoulder M25.511 719.41       Patient Active Problem List   Diagnosis   • Right ear pain   • Family history of breast cancer          PT Ortho     Row Name 19 0720       Subjective Comments    Subjective Comments  Pt states her shoulder is a little sore, but she says it is moving better.  -GC      User Key  (r) = Recorded By, (t) = Taken By, (c) = Cosigned By    Initials Name Provider Type    Coleman Paul, PT Physical Therapist                  PT Assessment/Plan     Row Name 19 07          PT Assessment    Assessment Comments  Pt is doing well with increasing shoulder ROM and improving function.  -GC        PT Plan    PT Plan Comments  Pt is to continue her HEP daily.  -GC       User Key  (r) = Recorded By, (t) = Taken By, (c) = Cosigned By    Initials Name Provider Type    Coleman Paul, PT Physical Therapist            Exercises     Row Name 19 07             Precautions    Existing Precautions/Restrictions  no known precautions/restrictions  -GC         Subjective Comments    Subjective Comments  Pt states her shoulder is a little sore, but she says it is moving better.  -GC         Exercise 2    Exercise Name 2  Cane stretch in standing for ABD  -GC      Cueing 2  Verbal;Demo  -GC      Reps 2  15  -GC      Time 2  5 secs  -GC         Exercise 3    Exercise Name 3  Cane stretch in supine for FLEX  -GC      Cueing 3  Verbal;Tactile  -GC      Reps 3  15  -GC      Time 3  5 secs  -GC         Exercise 4    Exercise Name 4  Cane stretch in supine for ER  -GC      Cueing 4  Verbal;Tactile  -GC      Reps 4  15  -GC      Time 4  5 secs  -GC         Exercise 5    Exercise Name 5  ER vs threaband  -GC      Cueing 5  Verbal;Demo  -GC      Reps 5   25  -GC      Time 5  latex free blue  -GC         Exercise 6    Exercise Name 6  IR vs theraband  -GC      Cueing 6  Verbal;Demo  -GC      Reps 6  25  -GC      Time 6  latex free black  -GC         Exercise 7    Exercise Name 7  Rowing vs theraband  -GC      Cueing 7  Verbal;Demo  -GC      Reps 7  25  -GC      Time 7  latex free black  -GC         Exercise 8    Exercise Name 8  Scaption Down  -GC      Cueing 8  Verbal;Demo;Tactile  -GC      Reps 8  25  -GC      Time 8  2#  -GC         Exercise 9    Exercise Name 9  SL Sleeper Strech  -GC      Cueing 9  Verbal;Tactile  -GC      Reps 9  10  -GC      Time 9  5 secs  -GC         Exercise 10    Exercise Name 10  Scaption Up  -GC      Cueing 10  Verbal;Demo  -GC      Reps 10  25  -GC      Time 10  2#  -GC         Exercise 11    Exercise Name 11  Shoulder EXT-bilateral  -GC      Cueing 11  Verbal;Demo  -GC      Reps 11  25  -GC      Time 11  latex free black  -GC        User Key  (r) = Recorded By, (t) = Taken By, (c) = Cosigned By    Initials Name Provider Type    GC Coleman Sandoval, PT Physical Therapist                      Manual Rx (last 36 hours)      Manual Treatments     Row Name 03/19/19 0720             Manual Rx 1    Manual Rx 1 Location  Right shoulder  -GC      Manual Rx 1 Type  AA/PROM  -GC      Manual Rx 1 Duration  10 reps each plane with patient supine in hooklying.  -GC         Manual Rx 2    Manual Rx 2 Location  right shoulder  -GC      Manual Rx 2 Type  GH joint mobilizations  -GC      Manual Rx 2 Grade  grade II  -GC      Manual Rx 2 Duration  5 min  -GC        User Key  (r) = Recorded By, (t) = Taken By, (c) = Cosigned By    Initials Name Provider Type    GC Coleman Sandoval, PT Physical Therapist                             Time Calculation:   Start Time: 0720  Stop Time: 0814  Time Calculation (min): 54 min   Therapy Suggested Charges     Code   Minutes Charges    None           Therapy Charges for Today     Code Description Service Date Service  Provider Modifiers Qty    61034017262  PT MANUAL THERAPY EA 15 MIN 3/19/2019 Coleman Sandoval, PT GP 1    73226145571 HC PT THER PROC EA 15 MIN 3/19/2019 Coleman Sandoval, PT GP 1                   Coleman Sandoval, PT  3/19/2019

## 2019-03-21 ENCOUNTER — HOSPITAL ENCOUNTER (OUTPATIENT)
Dept: PHYSICAL THERAPY | Facility: HOSPITAL | Age: 49
Setting detail: THERAPIES SERIES
Discharge: HOME | End: 2019-03-21

## 2019-03-21 DIAGNOSIS — M25.511 ACUTE PAIN OF RIGHT SHOULDER: Primary | ICD-10-CM

## 2019-03-21 PROCEDURE — 97110 THERAPEUTIC EXERCISES: CPT | Performed by: PHYSICAL THERAPIST

## 2019-03-21 PROCEDURE — 97140 MANUAL THERAPY 1/> REGIONS: CPT | Performed by: PHYSICAL THERAPIST

## 2019-03-21 NOTE — THERAPY TREATMENT NOTE
Outpatient Physical Therapy Ortho Treatment Note   [place]     Patient Name: Cori Griffith  : 1970  MRN: [id number]  Today's Date: 3/21/2019      Visit Date: 2019    Visit Dx:    ICD-10-CM ICD-9-CM   1. Acute pain of right shoulder M25.511 719.41       Patient Active Problem List   Diagnosis   • Right ear pain   • Family history of breast cancer        Past Medical History:   Diagnosis Date   • Lymphedema of both lower extremities         No past surgical history on file.    PT Ortho     Row Name 19 0720       Subjective Comments    Subjective Comments  Pt states her shoulder is a little sore, but she says it is moving better.  -GC      User Key  (r) = Recorded By, (t) = Taken By, (c) = Cosigned By    Initials Name Provider Type    Coleman Paul, PT Physical Therapist                      PT Assessment/Plan     Row Name 1915          PT Assessment    Assessment Comments  Pt is noted ot have increasd discomfort with her stretches this morning, bu ther ROM does not appear to be any less.  -GC        PT Plan    PT Plan Comments  Pt is to continue her HEP daily.   -GC       User Key  (r) = Recorded By, (t) = Taken By, (c) = Cosigned By    Initials Name Provider Type    Coleman Paul, PT Physical Therapist          Modalities     Row Name 19 0715             Moist Heat    MH Applied  Yes  -GC      Location  Right shoulder  with IFC with patient seated in chair.  -GC      Rx Minutes  15 mins  -GC      MH S/P Rx  Yes  -GC         ELECTRICAL STIMULATION    Attended/Unattended  Unattended  -GC      Stimulation Type  IFC  -GC      Location/Electrode Placement/Other  Right shoulder area/UT with MH.  -       PT E-Stim Unattended (Manual) Minutes  15  -GC        User Key  (r) = Recorded By, (t) = Taken By, (c) = Cosigned By    Initials Name Provider Type    Coleman Paul, PT Physical Therapist        Exercises     Row Name 19 0715             Precautions     Existing Precautions/Restrictions  no known precautions/restrictions  -GC         Subjective Comments    Subjective Comments  Pt states her shoulder seems a little more sore this morning.  -GC         Exercise 2    Exercise Name 2  Cane stretch in standing for ABD  -GC      Cueing 2  Verbal;Demo  -GC      Reps 2  15  -GC      Time 2  5 secs  -GC         Exercise 3    Exercise Name 3  Cane stretch in supine for FLEX  -GC      Cueing 3  Verbal;Tactile  -GC      Reps 3  15  -GC      Time 3  5 secs  -GC         Exercise 4    Exercise Name 4  Cane stretch in supine for ER  -GC      Cueing 4  Verbal;Tactile  -GC      Reps 4  15  -GC      Time 4  5 secs  -GC         Exercise 5    Exercise Name 5  ER vs threaband  -GC      Cueing 5  Verbal;Demo  -GC      Reps 5  25  -GC      Time 5  latex free blue  -GC         Exercise 6    Exercise Name 6  IR vs theraband  -GC      Cueing 6  Verbal;Demo  -GC      Reps 6  25  -GC      Time 6  latex free black  -GC         Exercise 7    Exercise Name 7  Rowing vs theraband  -GC      Cueing 7  Verbal;Demo  -GC      Reps 7  25  -GC      Time 7  latex free black  -GC         Exercise 8    Exercise Name 8  Scaption Down  -GC      Cueing 8  Verbal;Demo;Tactile  -GC      Reps 8  25  -GC      Time 8  2#  -GC         Exercise 9    Exercise Name 9  SL Sleeper Strech  -GC      Cueing 9  Verbal;Tactile  -GC      Reps 9  10  -GC      Time 9  5 secs  -GC         Exercise 10    Exercise Name 10  Scaption Up  -GC      Cueing 10  Verbal;Demo  -GC      Reps 10  25  -GC      Time 10  2#  -GC         Exercise 11    Exercise Name 11  Shoulder EXT-bilateral  -GC      Cueing 11  Verbal;Demo  -GC      Reps 11  25  -GC      Time 11  latex free black  -GC        User Key  (r) = Recorded By, (t) = Taken By, (c) = Cosigned By    Initials Name Provider Type    Coleman Paul, ALEXANDRA Physical Therapist                      Manual Rx (last 36 hours)      Manual Treatments     Row Name 03/21/19 0715             Manual Rx  1    Manual Rx 1 Location  Right shoulder  -GC      Manual Rx 1 Type  AA/PROM  -GC      Manual Rx 1 Duration  10 reps each plane with patient supine in hooklying.  -GC         Manual Rx 2    Manual Rx 2 Location  right shoulder  -GC      Manual Rx 2 Type  GH joint mobilizations  -GC      Manual Rx 2 Grade  grade II  -GC      Manual Rx 2 Duration  5 min  -GC        User Key  (r) = Recorded By, (t) = Taken By, (c) = Cosigned By    Initials Name Provider Type     Coleman Sandoval, PT Physical Therapist                             Time Calculation:   Start Time: 0715  Stop Time: 0834  Therapy Charges for Today     Code Description Service Date Service Provider Modifiers Qty    52625314792  PT MANUAL THERAPY EA 15 MIN 3/21/2019 Coleman Sandoval, PT GP 1    [id number] HC PT THER PROC EA 15 MIN 3/21/2019 Coleman Sandoval, PT GP 1                    Coleman Sandoval PT  3/21/2019

## 2019-03-26 ENCOUNTER — HOSPITAL ENCOUNTER (OUTPATIENT)
Dept: PHYSICAL THERAPY | Facility: HOSPITAL | Age: 49
Setting detail: THERAPIES SERIES
Discharge: HOME | End: 2019-03-26

## 2019-03-26 DIAGNOSIS — M25.511 ACUTE PAIN OF RIGHT SHOULDER: Primary | ICD-10-CM

## 2019-03-26 PROCEDURE — 97110 THERAPEUTIC EXERCISES: CPT | Performed by: PHYSICAL THERAPIST

## 2019-03-26 PROCEDURE — 97140 MANUAL THERAPY 1/> REGIONS: CPT | Performed by: PHYSICAL THERAPIST

## 2019-03-26 NOTE — THERAPY TREATMENT NOTE
Outpatient Physical Therapy Ortho Treatment Note   [place]     Patient Name: Cori Griffith  : 1970  MRN: [id number]  Today's Date: 3/26/2019      Visit Date: 2019    Visit Dx:    ICD-10-CM ICD-9-CM   1. Acute pain of right shoulder M25.511 719.41       Patient Active Problem List   Diagnosis   • Right ear pain   • Family history of breast cancer        Past Medical History:   Diagnosis Date   • Lymphedema of both lower extremities         No past surgical history on file.    PT Ortho     Row Name 19 0715       Subjective Comments    Subjective Comments  Pt states her shoulder is a little sore, but it is feeling better.  -GC      User Key  (r) = Recorded By, (t) = Taken By, (c) = Cosigned By    Initials Name Provider Type     Coleman Sandoval, PT Physical Therapist                      PT Assessment/Plan     Row Name 19 0715          PT Assessment    Assessment Comments  Pt is doing well with increasing shoulder ROM noted with her stretches today.  -GC        PT Plan    PT Plan Comments  Pt is to continue her HEP daily.  -GC       User Key  (r) = Recorded By, (t) = Taken By, (c) = Cosigned By    Initials Name Provider Type     Coleman Sandoval, PT Physical Therapist          Modalities     Row Name 19 0715             Moist Heat    MH Applied  Yes  -GC      Location  right shoulder with pt sitting  -GC      Rx Minutes  10 mins  -GC      MH Prior to Rx  Yes  -GC        User Key  (r) = Recorded By, (t) = Taken By, (c) = Cosigned By    Initials Name Provider Type     Coleman Sandoval, PT Physical Therapist        Exercises     Row Name 19 0715             Precautions    Existing Precautions/Restrictions  no known precautions/restrictions  -GC         Subjective Comments    Subjective Comments  Pt states her shoulder is a little sore, but it is feeling better.  -GC         Exercise 2    Exercise Name 2  Cane stretch in standing for ABD  -GC      Cueing 2  Verbal;Demo  -GC       Reps 2  15  -GC      Time 2  5 secs  -GC         Exercise 3    Exercise Name 3  Cane stretch in supine for FLEX  -GC      Cueing 3  Verbal;Tactile  -GC      Reps 3  15  -GC      Time 3  5 secs  -GC         Exercise 4    Exercise Name 4  Cane stretch in supine for ER  -GC      Cueing 4  Verbal;Tactile  -GC      Reps 4  15  -GC      Time 4  5 secs  -GC         Exercise 5    Exercise Name 5  ER vs threaband  -GC      Cueing 5  Verbal;Demo  -GC      Reps 5  25  -GC      Time 5  latex free black  -GC         Exercise 6    Exercise Name 6  IR vs theraband  -GC      Cueing 6  Verbal;Demo  -GC      Reps 6  25  -GC      Time 6  latex free black  -GC         Exercise 7    Exercise Name 7  Rowing vs theraband  -GC      Cueing 7  Verbal;Demo  -GC      Reps 7  25  -GC      Time 7  latex free black  -GC         Exercise 8    Exercise Name 8  Scaption Down  -GC      Cueing 8  Verbal;Demo;Tactile  -GC      Reps 8  25  -GC      Time 8  2#  -GC         Exercise 9    Exercise Name 9  SL Sleeper Strech  -GC      Cueing 9  Verbal;Tactile  -GC      Reps 9  10  -GC      Time 9  5 secs  -GC         Exercise 10    Exercise Name 10  Scaption Up  -GC      Cueing 10  Verbal;Demo  -GC      Reps 10  25  -GC      Time 10  2#  -GC         Exercise 11    Exercise Name 11  Shoulder EXT-bilateral  -GC      Cueing 11  Verbal;Demo  -GC      Reps 11  25  -GC      Time 11  latex free black  -GC        User Key  (r) = Recorded By, (t) = Taken By, (c) = Cosigned By    Initials Name Provider Type    GC Coleman Sandoval, PT Physical Therapist                      Manual Rx (last 36 hours)      Manual Treatments     Row Name 03/26/19 0715             Manual Rx 1    Manual Rx 1 Location  Right shoulder  -GC      Manual Rx 1 Type  AA/PROM  -GC      Manual Rx 1 Duration  10 reps each plane with patient supine in hooklying.  -GC         Manual Rx 2    Manual Rx 2 Location  right shoulder  -GC      Manual Rx 2 Type  GH joint mobilizations  -GC      Manual Rx 2  Grade  grade II  -      Manual Rx 2 Duration  5 min  -GC        User Key  (r) = Recorded By, (t) = Taken By, (c) = Cosigned By    Initials Name Provider Type     Coleman Sandoval, PT Physical Therapist                             Time Calculation:   Start Time: 0715  Stop Time: 0806  Time Calculation (min): 51 min  Therapy Charges for Today     Code Description Service Date Service Provider Modifiers Qty    40527673252  PT MANUAL THERAPY EA 15 MIN 3/26/2019 Coleman Sandoval, PT GP 1    [id number]  PT THER PROC EA 15 MIN 3/26/2019 Coleman Sandoval, PT GP 1                    Coleman Sandoval, PT  3/26/2019

## 2019-03-28 ENCOUNTER — HOSPITAL ENCOUNTER (OUTPATIENT)
Dept: PHYSICAL THERAPY | Facility: HOSPITAL | Age: 49
Setting detail: THERAPIES SERIES
Discharge: HOME | End: 2019-03-28

## 2019-03-28 DIAGNOSIS — M25.511 ACUTE PAIN OF RIGHT SHOULDER: Primary | ICD-10-CM

## 2019-03-28 PROCEDURE — 97140 MANUAL THERAPY 1/> REGIONS: CPT | Performed by: PHYSICAL THERAPIST

## 2019-03-28 PROCEDURE — 97110 THERAPEUTIC EXERCISES: CPT | Performed by: PHYSICAL THERAPIST

## 2019-03-28 NOTE — THERAPY TREATMENT NOTE
Outpatient Physical Therapy Ortho Treatment Note   [place]     Patient Name: Cori Griffith  : 1970  MRN: [id number]  Today's Date: 3/28/2019      Visit Date: 2019    Visit Dx:    ICD-10-CM ICD-9-CM   1. Acute pain of right shoulder M25.511 719.41       Patient Active Problem List   Diagnosis   • Right ear pain   • Family history of breast cancer        Past Medical History:   Diagnosis Date   • Lymphedema of both lower extremities         No past surgical history on file.    PT Ortho     Row Name 19 0720       Subjective Comments    Subjective Comments  Pt states her shoulder does feel some better today.  -GC    Row Name 19 0715       Subjective Comments    Subjective Comments  Pt states her shoulder is a little sore, but it is feeling better.  -GC      User Key  (r) = Recorded By, (t) = Taken By, (c) = Cosigned By    Initials Name Provider Type    Coleman Paul, PT Physical Therapist                      PT Assessment/Plan     Row Name 19 0720          PT Assessment    Assessment Comments  Pt is doing well with increasing shoulder ROM and decreasing c/o pain.  -GC        PT Plan    PT Plan Comments  Pt is to continue her HEP daily. Will re-ck in approximately 10 days.  -GC       User Key  (r) = Recorded By, (t) = Taken By, (c) = Cosigned By    Initials Name Provider Type    Coleman Paul, PT Physical Therapist            Exercises     Row Name 19 0720             Precautions    Existing Precautions/Restrictions  no known precautions/restrictions  -GC         Subjective Comments    Subjective Comments  Pt states her shoulder does feel some better today.  -GC         Exercise 2    Exercise Name 2  Cane stretch in standing for ABD  -GC      Cueing 2  Verbal;Demo  -GC      Reps 2  15  -GC      Time 2  5 secs  -GC         Exercise 3    Exercise Name 3  Cane stretch in supine for FLEX  -GC      Cueing 3  Verbal;Tactile  -GC      Reps 3  15  -GC      Time 3  5 secs   -GC         Exercise 4    Exercise Name 4  Cane stretch in supine for ER  -GC      Cueing 4  Verbal;Tactile  -GC      Reps 4  15  -GC      Time 4  5 secs  -GC         Exercise 5    Exercise Name 5  ER vs threaband  -GC      Cueing 5  Verbal;Demo  -GC      Reps 5  25  -GC      Time 5  latex free black  -GC         Exercise 6    Exercise Name 6  IR vs theraband  -GC      Cueing 6  Verbal;Demo  -GC      Reps 6  25  -GC      Time 6  latex free black  -GC         Exercise 7    Exercise Name 7  Rowing vs theraband  -GC      Cueing 7  Verbal;Demo  -GC      Reps 7  25  -GC      Time 7  latex free black  -GC         Exercise 8    Exercise Name 8  Scaption Down  -GC      Cueing 8  Verbal;Demo;Tactile  -GC      Reps 8  25  -GC      Time 8  2#  -GC         Exercise 9    Exercise Name 9  SL Sleeper Strech  -GC      Cueing 9  Verbal;Tactile  -GC      Reps 9  10  -GC      Time 9  5 secs  -GC         Exercise 10    Exercise Name 10  Scaption Up  -GC      Cueing 10  Verbal;Demo  -GC      Reps 10  25  -GC      Time 10  2#  -GC         Exercise 11    Exercise Name 11  Shoulder EXT-bilateral  -GC      Cueing 11  Verbal;Demo  -GC      Reps 11  25  -GC      Time 11  latex free black  -GC        User Key  (r) = Recorded By, (t) = Taken By, (c) = Cosigned By    Initials Name Provider Type    GC Coleman Sandoval, PT Physical Therapist                      Manual Rx (last 36 hours)      Manual Treatments     Row Name 03/28/19 0720             Manual Rx 1    Manual Rx 1 Location  Right shoulder  -GC      Manual Rx 1 Type  AA/PROM  -GC      Manual Rx 1 Duration  10 reps each plane with patient supine in hooklying.  -GC         Manual Rx 2    Manual Rx 2 Location  right shoulder  -GC      Manual Rx 2 Type  GH joint mobilizations  -GC      Manual Rx 2 Grade  grade II  -GC      Manual Rx 2 Duration  5 min  -GC        User Key  (r) = Recorded By, (t) = Taken By, (c) = Cosigned By    Initials Name Provider Type    GC Coleman Sandoval, PT Physical  Therapist                             Time Calculation:   Start Time: 0720  Stop Time: 0823  Time Calculation (min): 63 min  Therapy Charges for Today     Code Description Service Date Service Provider Modifiers Qty    18986510543  PT MANUAL THERAPY EA 15 MIN 3/28/2019 Coleman Sandoval, PT GP 1    [id number]  PT THER PROC EA 15 MIN 3/28/2019 Coleman Sandoval, PT GP 1                    Coleman Sandoval, PT  3/28/2019

## 2019-04-09 ENCOUNTER — APPOINTMENT (OUTPATIENT)
Dept: PHYSICAL THERAPY | Facility: HOSPITAL | Age: 49
End: 2019-04-09

## 2019-04-11 ENCOUNTER — HOSPITAL ENCOUNTER (OUTPATIENT)
Dept: PHYSICAL THERAPY | Facility: HOSPITAL | Age: 49
Setting detail: THERAPIES SERIES
Discharge: HOME | End: 2019-04-11

## 2019-04-11 DIAGNOSIS — M25.511 ACUTE PAIN OF RIGHT SHOULDER: Primary | ICD-10-CM

## 2019-04-11 PROCEDURE — 97110 THERAPEUTIC EXERCISES: CPT | Performed by: PHYSICAL THERAPIST

## 2019-04-11 PROCEDURE — 97140 MANUAL THERAPY 1/> REGIONS: CPT | Performed by: PHYSICAL THERAPIST

## 2019-04-11 NOTE — THERAPY TREATMENT NOTE
Outpatient Physical Therapy Ortho Treatment Note   [place]     Patient Name: Cori Griffith  : 1970  MRN: [id number]  Today's Date: 2019      Visit Date: 2019    Visit Dx:    ICD-10-CM ICD-9-CM   1. Acute pain of right shoulder M25.511 719.41       Patient Active Problem List   Diagnosis   • Right ear pain   • Family history of breast cancer        Past Medical History:   Diagnosis Date   • Lymphedema of both lower extremities         No past surgical history on file.    PT Ortho     Row Name 19 0715       Subjective Comments    Subjective Comments  Pt states her shoulder is a little more sore today.  -GC      User Key  (r) = Recorded By, (t) = Taken By, (c) = Cosigned By    Initials Name Provider Type    Coleman Paul, PT Physical Therapist                      PT Assessment/Plan     Row Name 19 0715          PT Assessment    Assessment Comments  Pt is noted ot have increased c/o soreness and discomfort with her exercises this morning.  -GC        PT Plan    PT Plan Comments  Pt is to continue her HEP daily.  -GC       User Key  (r) = Recorded By, (t) = Taken By, (c) = Cosigned By    Initials Name Provider Type    Coleman Paul, PT Physical Therapist            Exercises     Row Name 19 0715             Precautions    Existing Precautions/Restrictions  no known precautions/restrictions  -GC         Subjective Comments    Subjective Comments  Pt states her shoulder is a little more sore today.  -GC         Exercise 2    Exercise Name 2  Cane stretch in standing for ABD  -GC      Cueing 2  Verbal;Demo  -GC      Reps 2  15  -GC      Time 2  5 secs  -GC         Exercise 3    Exercise Name 3  Cane stretch in supine for FLEX  -GC      Cueing 3  Verbal;Tactile  -GC      Reps 3  15  -GC      Time 3  5 secs  -GC         Exercise 4    Exercise Name 4  Cane stretch in supine for ER  -GC      Cueing 4  Verbal;Tactile  -GC      Reps 4  15  -GC      Time 4  5 secs  -GC          Exercise 5    Exercise Name 5  ER vs threaband  -GC      Cueing 5  Verbal;Demo  -GC      Reps 5  25  -GC      Time 5  latex free black  -GC         Exercise 6    Exercise Name 6  IR vs theraband  -GC      Cueing 6  Verbal;Demo  -GC      Reps 6  25  -GC      Time 6  latex free black  -GC         Exercise 7    Exercise Name 7  Rowing vs theraband  -GC      Cueing 7  Verbal;Demo  -GC      Reps 7  25  -GC      Time 7  latex free black  -GC         Exercise 8    Exercise Name 8  Scaption Down  -GC      Cueing 8  Verbal;Demo;Tactile  -GC      Reps 8  25  -GC      Time 8  2#  -GC         Exercise 9    Exercise Name 9  SL Sleeper Strech  -GC      Cueing 9  Verbal;Tactile  -GC      Reps 9  10  -GC      Time 9  5 secs  -GC         Exercise 10    Exercise Name 10  Scaption Up  -GC      Cueing 10  Verbal;Demo  -GC      Reps 10  25  -GC      Time 10  2#  -GC         Exercise 11    Exercise Name 11  Shoulder EXT-bilateral  -GC      Cueing 11  Verbal;Demo  -GC      Reps 11  25  -GC      Time 11  latex free black  -GC        User Key  (r) = Recorded By, (t) = Taken By, (c) = Cosigned By    Initials Name Provider Type    GC Coleman Sandoval, PT Physical Therapist                      Manual Rx (last 36 hours)      Manual Treatments     Row Name 04/11/19 0715             Manual Rx 1    Manual Rx 1 Location  Right shoulder  -GC      Manual Rx 1 Type  AA/PROM  -GC      Manual Rx 1 Duration  10 reps each plane with patient supine in hooklying.  -GC         Manual Rx 2    Manual Rx 2 Location  right shoulder  -GC      Manual Rx 2 Type  GH joint mobilizations  -GC      Manual Rx 2 Grade  grade II  -GC      Manual Rx 2 Duration  5 min  -GC        User Key  (r) = Recorded By, (t) = Taken By, (c) = Cosigned By    Initials Name Provider Type    GC Coleman Sandoval, PT Physical Therapist                             Time Calculation:   Start Time: 0715  Stop Time: 0813  Time Calculation (min): 58 min  Therapy Charges for Today     Code Description  Service Date Service Provider Modifiers Qty    [id number]  PT MANUAL THERAPY EA 15 MIN 4/11/2019 Coleman Sandoval, PT GP 1    [id number] HC PT THER PROC EA 15 MIN 4/11/2019 Coleman Sandoval, PT GP 1                    Coleman Sandoval, PT  4/11/2019

## 2019-04-18 ENCOUNTER — APPOINTMENT (OUTPATIENT)
Dept: PHYSICAL THERAPY | Facility: HOSPITAL | Age: 49
End: 2019-04-18

## 2019-10-28 ENCOUNTER — TELEPHONE (OUTPATIENT)
Dept: OBSTETRICS AND GYNECOLOGY | Facility: CLINIC | Age: 49
End: 2019-10-28

## 2019-10-28 NOTE — TELEPHONE ENCOUNTER
I would prefer that she come in and see me. I dont like Black Cohosh and there are other things that area safer to try if she is having symptoms.

## 2019-10-28 NOTE — TELEPHONE ENCOUNTER
SHE HASNT HAD PERIOD IN MONTHS BUT SHE IS NOW HAVING HOT FLASHES WANTS TO KNOW IF SHE CAN TAKE BLACK CHOSH